# Patient Record
Sex: FEMALE | Race: AMERICAN INDIAN OR ALASKA NATIVE | Employment: FULL TIME | URBAN - METROPOLITAN AREA
[De-identification: names, ages, dates, MRNs, and addresses within clinical notes are randomized per-mention and may not be internally consistent; named-entity substitution may affect disease eponyms.]

---

## 2018-02-06 ENCOUNTER — SURG/PROC ORDERS (OUTPATIENT)
Dept: SURGERY | Age: 58
End: 2018-02-06

## 2018-02-06 ENCOUNTER — INITIAL CONSULT (OUTPATIENT)
Dept: SURGERY | Age: 58
End: 2018-02-06

## 2018-02-06 VITALS
BODY MASS INDEX: 43.87 KG/M2 | DIASTOLIC BLOOD PRESSURE: 72 MMHG | SYSTOLIC BLOOD PRESSURE: 109 MMHG | WEIGHT: 217.6 LBS | HEART RATE: 74 BPM | HEIGHT: 59 IN

## 2018-02-06 DIAGNOSIS — K43.2 INCISIONAL HERNIA, WITHOUT OBSTRUCTION OR GANGRENE: ICD-10-CM

## 2018-02-06 PROCEDURE — 99243 OFF/OP CNSLTJ NEW/EST LOW 30: CPT | Performed by: SURGERY

## 2018-02-06 PROCEDURE — G8484 FLU IMMUNIZE NO ADMIN: HCPCS | Performed by: SURGERY

## 2018-02-06 PROCEDURE — 3017F COLORECTAL CA SCREEN DOC REV: CPT | Performed by: SURGERY

## 2018-02-06 PROCEDURE — G8417 CALC BMI ABV UP PARAM F/U: HCPCS | Performed by: SURGERY

## 2018-02-06 PROCEDURE — 3014F SCREEN MAMMO DOC REV: CPT | Performed by: SURGERY

## 2018-02-06 PROCEDURE — G8427 DOCREV CUR MEDS BY ELIG CLIN: HCPCS | Performed by: SURGERY

## 2018-02-06 RX ORDER — SODIUM CHLORIDE 0.9 % (FLUSH) 0.9 %
10 SYRINGE (ML) INJECTION EVERY 12 HOURS SCHEDULED
Status: CANCELLED | OUTPATIENT
Start: 2018-02-06

## 2018-02-06 RX ORDER — SODIUM CHLORIDE 0.9 % (FLUSH) 0.9 %
10 SYRINGE (ML) INJECTION PRN
Status: CANCELLED | OUTPATIENT
Start: 2018-02-06

## 2018-02-06 NOTE — PATIENT INSTRUCTIONS
[address] [address]  Phone: [phone]  Fax: [phone] will be scheduled for surgery with Dr. Rose Marie Jennings. · The office will call you with the date and time that surgery is scheduled. · Please take note of these instructions for surgery:  · You should have nothing by mouth after midnight the night before your surgery - this includes no food or water. · Your surgery will be cancelled if you have taken anything by mouth after midnight, NO exceptions. · You will need to have a history and physical prior to your surgery. This will need to be completed up to 30 days before your surgery. This H/P can be completed by your family doctor or the hospital.   · IF you take coumadin (warfarin), please stop taking this medication 5 days prior to your surgery. · IF you take plavix, please stop taking this 7 days prior to your surgery. · Please contact our office if you have a pacemaker or defibrillator. · IF you are allergic to latex, please tell our office prior to your surgery. This is important in know before scheduling your surgery. · IF you are having an out patient surgery, you will need someone available to drive you home after your surgery, and to also stay with you for the rest of the day. · IF you are having a surgery requiring an inpatient stay in the hospital, you will need someone to drive you home upon discharge from the hospital.  · Please contact Dr. Darien Salas assistant Loren Mosher if you have any questions or concerns. · Please call the office with any changes in your symptoms or further questions/concerns.  [phone]

## 2018-02-06 NOTE — PROGRESS NOTES
imaging available to evaluate the specifics of the defect. Will get CT to assess size of defect. If <6-8cm diameter, should be appropriate for minimally invasive intraperitoneal defect closure with mesh; however, if defect is larger or more complicated, could require an open, component separation technique. We discussed risks and complications of the procedure. We reviewed the risks of obesity related to potential repair failure. Pt has several medical issues that prevent her from achieving much exercise, but she has been adjusting her diet to lose weight. She does not smoke and is not diabetic. Will need medical clearance and a plan for holding her anticoagulation - does she need a heparin/Lovenox window?     SALOMON Prepmatic MERCADO

## 2018-02-13 ENCOUNTER — HOSPITAL ENCOUNTER (OUTPATIENT)
Dept: CT IMAGING | Age: 58
Discharge: HOME | End: 2018-02-13
Attending: SURGERY | Admitting: SURGERY

## 2018-02-13 DIAGNOSIS — K43.2 INCISIONAL HERNIA, WITHOUT OBSTRUCTION OR GANGRENE: ICD-10-CM

## 2018-02-14 DIAGNOSIS — R19.09 RIGHT GROIN MASS: Primary | ICD-10-CM

## 2018-02-15 DIAGNOSIS — R19.09 RIGHT GROIN MASS: Primary | ICD-10-CM

## 2018-02-16 ENCOUNTER — TELEPHONE (OUTPATIENT)
Dept: SURGERY | Age: 58
End: 2018-02-16

## 2018-02-16 NOTE — TELEPHONE ENCOUNTER
I scheduled her for a US guided core biopsy after a CT scan finding of a right groin mass. She called back stating she wanted to see Dr Irene Isaacs. I cancelled the biopsy. She will call back if she decides to have her hernia repair.

## 2022-05-28 ENCOUNTER — HOSPITAL ENCOUNTER (EMERGENCY)
Age: 62
Discharge: HOME | End: 2022-05-28
Payer: COMMERCIAL

## 2022-05-28 VITALS
HEIGHT: 58 IN | SYSTOLIC BLOOD PRESSURE: 146 MMHG | BODY MASS INDEX: 48.28 KG/M2 | RESPIRATION RATE: 16 BRPM | HEART RATE: 80 BPM | DIASTOLIC BLOOD PRESSURE: 97 MMHG | WEIGHT: 230 LBS | TEMPERATURE: 99.5 F | OXYGEN SATURATION: 97 %

## 2022-05-28 DIAGNOSIS — K08.89 DENTALGIA: Primary | ICD-10-CM

## 2022-05-28 PROCEDURE — 6370000000 HC RX 637 (ALT 250 FOR IP): Performed by: PHYSICIAN ASSISTANT

## 2022-05-28 PROCEDURE — 99283 EMERGENCY DEPT VISIT LOW MDM: CPT

## 2022-05-28 RX ORDER — CLINDAMYCIN HYDROCHLORIDE 300 MG/1
300 CAPSULE ORAL 3 TIMES DAILY
Qty: 30 CAPSULE | Refills: 0 | Status: SHIPPED | OUTPATIENT
Start: 2022-05-28 | End: 2022-06-07

## 2022-05-28 RX ORDER — CLINDAMYCIN HYDROCHLORIDE 150 MG/1
300 CAPSULE ORAL ONCE
Status: COMPLETED | OUTPATIENT
Start: 2022-05-28 | End: 2022-05-28

## 2022-05-28 RX ORDER — HYDROCODONE BITARTRATE AND ACETAMINOPHEN 7.5; 325 MG/1; MG/1
1 TABLET ORAL ONCE
Status: COMPLETED | OUTPATIENT
Start: 2022-05-28 | End: 2022-05-28

## 2022-05-28 RX ADMIN — CLINDAMYCIN HYDROCHLORIDE 300 MG: 150 CAPSULE ORAL at 13:07

## 2022-05-28 RX ADMIN — HYDROCODONE BITARTRATE AND ACETAMINOPHEN 1 TABLET: 7.5; 325 TABLET ORAL at 13:07

## 2022-05-28 ASSESSMENT — PAIN DESCRIPTION - LOCATION: LOCATION: MOUTH

## 2022-05-28 ASSESSMENT — PAIN SCALES - GENERAL
PAINLEVEL_OUTOF10: 10
PAINLEVEL_OUTOF10: 5

## 2022-05-28 ASSESSMENT — PAIN - FUNCTIONAL ASSESSMENT: PAIN_FUNCTIONAL_ASSESSMENT: 0-10

## 2022-05-28 NOTE — ED PROVIDER NOTES
[facility] Emergency Department    CHIEF COMPLAINT  Dental Pain      SHARED SERVICE VISIT:  Evaluated by ALINA. My supervising physician was available for consultation. HISTORY OF PRESENT ILLNESS  Gildardo Cote is a 62 y.o. female who presents to the ED complaining of 2-week history of progressively worsening dental pain. Patient with history of poor dentition. States that she does not currently have a dentist.  She is a non-smoker. Dental pain worse with eating and drinking. Currently rated 8 out of 10. Has found no obvious aggravating or alleviating factors. Does not appear to radiate. Denies fevers chills. No nausea or vomiting. Denies headaches. No chest pain or shortness of breath. No other complaints, modifying factors or associated symptoms. Nursing notes reviewed. Past Medical History:   Diagnosis Date    Cerebral artery occlusion with cerebral infarction ([address]     DVT, lower extremity (HCC)     Bilat    Embolic stroke ([address] 23/7411    NO RESIDUAL    History of blood transfusion     X2 WHEN TAKING CHEMO    Hx of blood clots     Incisional hernia     Mitral regurgitation     mild to moderate    NSTEMI (non-ST elevated myocardial infarction) ([address] 04/18/2016    PT STATES CARDIOLOGIST SAID NO CAD AFTER CATH.     Ovarian cancer ([address]     Prolonged emergence from general anesthesia     WAKES SLOWLY    Pulmonary emboli (HCC)     Right groin mass     Uterine cancer ([address] 2016    Chemo, 3 RADIATION TREATMENTS     Past Surgical History:   Procedure Laterality Date    ABDOMEN SURGERY      CARDIAC CATHETERIZATION  04/13/2016    no CAD, normal LV function    HYSTERECTOMY  05/06/2016    BRIAN/Bilat S&O    OTHER SURGICAL HISTORY  2016    IVC FILTER, THEN REMOVED     Family History   Problem Relation Age of Onset    Stroke Mother     Cancer Father         COLON    Heart Attack Father     Stroke Sister      Social History     Socioeconomic History    clindamycin (CLEOCIN) 300 MG capsule Take 1 capsule by mouth 3 times daily for 10 days 30 capsule 0    warfarin (COUMADIN) 5 MG tablet Take 5 mg by mouth 10mg TU TH SA [place]  5mg M-W-F (Patient not taking: Reported on 5/28/2022)       Allergies   Allergen Reactions    Demerol Hcl [Meperidine] Nausea Only    Codeine Nausea And Vomiting    Pcn [Penicillins] Rash       REVIEW OF SYSTEMS  6 systems reviewed, pertinent positives per HPI otherwise noted to be negative    PHYSICAL EXAM  BP (!) 146/97   Pulse 80   Temp 99.5 °F (37.5 °C) (Oral)   Resp 16   Ht 4' 10\" (1.473 m)   Wt 230 lb (104.3 kg)   SpO2 97%   BMI 48.07 kg/m²   GENERAL APPEARANCE: Awake and alert. Cooperative. No acute distress. HEAD: Normocephalic. Atraumatic. EYES: PERRL. EOM's grossly intact. ENT: Mucous membranes are moist.  Patient with poor dentition throughout. Several teeth on right upper and lower gums decayed to gumline which she reports is being tender. Gum lines are without swelling, fluctuance, induration or drainage. Floor the mouth soft and nonraised. No vesicles, blistering or sloughing. Controlling secretions appropriately. LYMPH: No periauricular, submental, or cervical lymphadenopathy. NECK: Supple. Normal ROM. CHEST: Equal symmetric chest rise. LUNGS: Breathing is unlabored. Speaking comfortably in full sentences. Abdomen: Nondistended  EXTREMITIES: MAEE. No acute deformities. SKIN: Warm and dry. NEUROLOGICAL: Alert and oriented. Strength is 5/5 in all extremities and sensation is intact. ED COURSE   Patient received norco for pain, with good relief. Triage vital stable. Initiated on clindamycin. Patient was provided with a dental referral.  Discharged on oral antibiotics. Have discussed return precautions as well as recommendations for follow-up otherwise and patient is in agreement and comfortable discharge.   A discussion was had with Ms. Desiree Faith regarding dental pain, ED findings and recommendations for follow-up. Risk management discussed and shared decision making had with patient and/or surrogate. All questions were answered. Patient will follow up with dentist of her choice to soon as possible for further evaluation/treatment. Patient will return to ED for new/worsening symptoms. Patient was sent home with a prescription for clindamycin. MDM  I estimate there is LOW risk for a ANAPHYLAXIS, DEEP SPACE INFECTION (e.g., TRENTS ANGINA OR RETROPHARYNGEAL ABSCESS), EPIGLOTTITIS, MENINGITIS, or AIRWAY COMPROMISE, thus I consider the discharge disposition reasonable. Also, there is no evidence or peritonitis, sepsis, or toxicity. Emile Finn and I have discussed the diagnosis and risks, and we agree with discharging home to follow-up with their primary doctor. We also discussed returning to the Emergency Department immediately if new or worsening symptoms occur. We have discussed the symptoms which are most concerning (e.g., changing or worsening pain, trouble swallowing or breathing, neck stiffness or fever) that necessitate immediate return. Final Impression  1. Dentalgia      Discharge Vital Signs:  Blood pressure (!) 146/97, pulse 80, temperature 99.5 °F (37.5 °C), temperature source Oral, resp. rate 16, height 4' 10\" (1.473 m), weight 230 lb (104.3 kg), SpO2 97 %. DISPOSITION  Patient was discharged to home in good condition.             Michele [place], [place]  05/28/22 [id number]

## 2022-05-28 NOTE — ED NOTES
Dental pain. Pt states \"I have had tooth aches for about 2 weeks unable to see Dentist\".      Eldon Calderon, IVON  98/85/07 [id number]

## 2022-05-28 NOTE — ED NOTES
Pt scripts x1 instructed to follow up with Dentist/List provided. Assessed per Coco HERNANDEZ.      Clayton Luque LPN  55/53/05 0891